# Patient Record
(demographics unavailable — no encounter records)

---

## 2024-12-09 NOTE — ASSESSMENT
[FreeTextEntry1] : 76 year old female referred for thyroid cancer, S/P total thyroidectomy. Left central compartment neck dissection level 6 on 8/4/2021.  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 1 out of 2 LN involved, stage [id number].  she then got ESCALONA treatment on 10/12/21 76.7 mCi. Post therapy scan showed no evidence of metastases. Has been on replacement since then.   Hypothyroidism: TG tumor marker and TG ab appropriately suppressed in 3/22, in 6/22, 10/22 , were high in 3/23 but then repeat  in 7/23 suppressed again in 12/23 and 6/24 Recent TG in 12/24 suppressed .  thyroid US in 3/22, 3/22  and then in 5/24 showed no evidence of recurrent disease.   Previous TSH was mildly high( 0.29) , we increased the dose of levothyroxine from 175 mcg daily to 200 mcg daily.  goal is below 0.1   Hypothyroidism: Now TSH 0.01 in 12/24  continue same dose for now, levothyroxine 200 mcg daily. Will get thyroid blood work with TG levels in  6 months. Neck US next visit.  Vitamin D def:  to take vitamin D replacement daily.    Obesity:  diet and exercise discussed.      RTC in 6 months.

## 2024-12-09 NOTE — HISTORY OF PRESENT ILLNESS
[FreeTextEntry1] : 77 year old female for follow up of for thyroid cancer.  Per patient during her routine eval her pcp felt something in neck, she had neck US in 6/21 showing 1.3 cm left nodule with calcification.Had  FNA 07/06/2021 showed Lymph node, LEVEL 6, cervical-positive for malignancy. She has seen ENT , had total thyroidectomy. Left central compartment neck dissection level 6 on  8/4/2021. No post op complications.No muscle cramps, no perioral numbness.  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 1 out of 2 LN involved, stage [id number]. The size of her lesions smaller than 1 cm and probably would not need ESCALONA ablation, but due to 1 LN being positive, if there is any other foci of cancer on any other LN , ESCALONA probably would  take care of it.Hence decision was made to do ESCALONA treatment, she then got ESCALONA treatment on 10/12/21 76.7 mCi . Post therapy scan showed no evidence of metastases. Has been on replacement since then. TG tumor marker and TG ab appropriately suppressed in 3/22, in 6/22,  10/22 , however were high in 3/23. Previous  TSH  was mildly high( 0.29) , we increased  the dose of levothyroxine from  175 mcg daily  to 200 mcg daily.  Here for follow up. Currently on levothyroxine 200 mcg daily. Takes it empty stomach in morning, denies missing any doses.  Energy is fine. Weight is stable. Having some vaginal itch, going to see OB. No sweating, no palpitations, no shakiness, no tremors. No family h/o thyroid disorder or cancer.no h/o neck radiation. No dysphagia, no SOB.Denies heat or cold intolerance, no weight changes, no constipation or diarrhea, no skin or hair changes. Takes calcium and vitamin D daily. No muscle spams and cramp.

## 2024-12-09 NOTE — PHYSICAL EXAM
[Alert] : alert [Well Nourished] : well nourished [No Acute Distress] : no acute distress [Normal Sclera/Conjunctiva] : normal sclera/conjunctiva [No Proptosis] : no proptosis [No Lid Lag] : no lid lag [No Neck Mass] : no neck mass was observed [Well Healed Scar] : well healed scar [No Respiratory Distress] : no respiratory distress [No Accessory Muscle Use] : no accessory muscle use [Clear to Auscultation] : lungs were clear to auscultation bilaterally [Normal S1, S2] : normal S1 and S2 [Normal Rate] : heart rate was normal [Regular Rhythm] : with a regular rhythm [Normal Anterior Cervical Nodes] : no anterior cervical lymphadenopathy [No Tremors] : no tremors [Oriented x3] : oriented to person, place, and time [Normal Affect] : the affect was normal [Normal Insight/Judgement] : insight and judgment were intact [Normal Mood] : the mood was normal

## 2025-06-13 NOTE — HISTORY OF PRESENT ILLNESS
[FreeTextEntry1] : 77 year old female for follow up of for thyroid cancer.  Per patient during her routine eval her pcp felt something in neck, she had neck US in 6/21 showing 1.3 cm left nodule with calcification.Had  FNA 07/06/2021 showed Lymph node, LEVEL 6, cervical-positive for malignancy. She has seen ENT , had total thyroidectomy. Left central compartment neck dissection level 6 on  8/4/2021. No post op complications.No muscle cramps, no perioral numbness.  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 1 out of 2 LN involved, stage [id number]. The size of her lesions smaller than 1 cm and probably would not need ESCALONA ablation, but due to 1 LN being positive, if there is any other foci of cancer on any other LN , ESCALONA probably would  take care of it.Hence decision was made to do ESCALONA treatment, she then got ESCALONA treatment on 10/12/21 76.7 mCi . Post therapy scan showed no evidence of metastases. Has been on replacement since then. TG tumor marker and TG ab appropriately suppressed in 3/22, in 6/22,  10/22 , however were high in 3/23. Previous  TSH  was mildly high( 0.29) , we increased  the dose of levothyroxine from  175 mcg daily  to 200 mcg daily.  Here for follow up. Currently on levothyroxine 200 mcg daily. Takes it empty stomach in morning, denies missing any doses.  Feels well, no new complains. Energy is fine. Weight is stable.No sweating, no palpitations, no shakiness, no tremors. No muscle spams and cramp. Still having some vaginal itch, seeing OB. No family h/o thyroid disorder or cancer.no h/o neck radiation.  No dysphagia, no SOB. Denies heat or cold intolerance, no weight changes, no constipation or diarrhea, no skin or hair changes. stopped taking  calcium and vitamin D daily.

## 2025-06-13 NOTE — ASSESSMENT
[FreeTextEntry1] : 77 year old female referred for thyroid cancer, S/P total thyroidectomy. Left central compartment neck dissection level 6 on 8/4/2021.  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 1 out of 2 LN involved, stage [id number].  she then got ESCALONA treatment on 10/12/21 76.7 mCi. Post therapy scan showed no evidence of metastases. Has been on replacement since then.   Hypothyroidism: TG tumor marker and TG ab appropriately suppressed in 3/22, in 6/22, 10/22 , were high in 3/23 but then repeat  in 7/23 suppressed again in 12/23 , 6/24 , 12 /24 and now in 6/25 Recent TG in 6/25 suppressed .  thyroid US in 3/22, 3/22  , 5/24 and now in 6/25  showed no evidence of recurrent disease.   Previous TSH was mildly high( 0.29) , we increased the dose of levothyroxine from 175 mcg daily to 200 mcg daily.  goal is below 0.1   Hypothyroidism:  TSH 0.01 in 6/25  continue same dose for now, levothyroxine 200 mcg daily. Will get thyroid blood work with TG levels in  6 months.   Vitamin D def: to take vitamin D replacement daily.    Obesity: diet and exercise discussed.      RTC in 6 months.

## 2025-06-13 NOTE — PHYSICAL EXAM
[Alert] : alert [Well Nourished] : well nourished [No Acute Distress] : no acute distress [Normal Sclera/Conjunctiva] : normal sclera/conjunctiva [No Neck Mass] : no neck mass was observed [Well Healed Scar] : well healed scar [No Respiratory Distress] : no respiratory distress [No Accessory Muscle Use] : no accessory muscle use [Clear to Auscultation] : lungs were clear to auscultation bilaterally [Normal S1, S2] : normal S1 and S2 [Normal Rate] : heart rate was normal [Regular Rhythm] : with a regular rhythm [Normal Anterior Cervical Nodes] : no anterior cervical lymphadenopathy [No Tremors] : no tremors [Oriented x3] : oriented to person, place, and time